# Patient Record
Sex: MALE | URBAN - METROPOLITAN AREA
[De-identification: names, ages, dates, MRNs, and addresses within clinical notes are randomized per-mention and may not be internally consistent; named-entity substitution may affect disease eponyms.]

---

## 2022-11-25 ENCOUNTER — NURSE TRIAGE (OUTPATIENT)
Dept: NURSING | Facility: CLINIC | Age: 57
End: 2022-11-25

## 2022-11-25 NOTE — TELEPHONE ENCOUNTER
"Caller is pt's friend (Nidhi).  Currently speaking with pt, and pt also gives verbal permission for triager to speak w/friend.    Reports \"just returned from [place].\"  Symptoms onset 48 hours ago:  - fever (103.3 orally at onset)  - chills  - cough  - fainting episode x1 today  - hoarse voice  Home covid test negative.  Test used 24 hours ago.    Current temp 103.0 with Tylenol taken 45 mins ago.  Caller states \"He started fainting and I was able to catch him.\"  \"We both happened to be near each other.\"    Pt denies faintness now per speaking directly with pt.  Exhibits clarity of speech and thought.  States \"feels sweaty\" currently.  Able to eat/drink.    Pt declines offer of virtual provider visit to discuss next steps including possible orders for influenza testing and possible Tamiflu.  Prefers to continue home care measures.  Friend reports an urgent care nearby which they will use if fever climbs to 104 w/Tylenol.  Also discussed calling 911 if fainting recurs.    Griselda PERALTA Health Nurse Advisor     Reason for Disposition    Probable influenza (fever) with no complications and not HIGH RISK    Additional Information    Negative: SEVERE difficulty breathing (e.g., struggling for each breath, speaks in single words)    Negative: Bluish (or gray) lips or face    Negative: Shock suspected (e.g., cold/pale/clammy skin, too weak to stand, low BP, rapid pulse)    Negative: Sounds like a life-threatening emergency to the triager    Negative: Symptoms of COVID-19 (e.g., cough, fever, SOB, or others) and lives in an area with community spread    Negative: Sounds like a cold and there is no fever    Negative: Cough and there is no fever    Negative: Severe cough    Negative: Throat pain and there is no fever    Negative: Severe sore throat    Negative: Influenza vaccine reaction is suspected    Negative: Headache and stiff neck (can't touch chin to chest)    Negative: Chest pain  (Exception: MILD central chest pain, " present only when coughing.)    Negative: Difficulty breathing that is not severe and not relieved by cleaning out the nose    Negative: Patient sounds very sick or weak to the triager    Negative: Fever > 104 F (40 C)    Negative: Fever > 101 F (38.3 C) and over 60 years of age    Negative: Fever > 100.0 F (37.8 C) and diabetes mellitus or weak immune system (e.g., HIV positive, cancer chemo, splenectomy, organ transplant, chronic steroids)    Negative: Fever > 100.0 F (37.8 C) and bedridden (e.g., nursing home patient, stroke, chronic illness, recovering from surgery)    Negative: HIGH RISK (e.g., age > 64 years, pregnant, HIV+, chronic medical condition) and flu symptoms    Negative: Using nasal washes and pain medicine > 24 hours and sinus pain (lower forehead, cheekbone, or eye) persists    Negative: Fever present > 3 days (72 hours)    Negative: Fever returns after gone for over 24 hours and symptoms worse (or not improved)    Negative: Earache    Negative: Patient wants to be seen    Negative: Patient requests antiviral medicine for influenza and flu symptoms present < 48 hours    Negative: Nasal discharge present > 10 days    Negative: Cough present > 3 weeks    Protocols used: INFLUENZA - SEASONAL-A-OH